# Patient Record
Sex: MALE | Race: BLACK OR AFRICAN AMERICAN | URBAN - METROPOLITAN AREA
[De-identification: names, ages, dates, MRNs, and addresses within clinical notes are randomized per-mention and may not be internally consistent; named-entity substitution may affect disease eponyms.]

---

## 2024-04-18 ENCOUNTER — APPOINTMENT (OUTPATIENT)
Dept: URBAN - METROPOLITAN AREA CLINIC 214 | Age: 64
Setting detail: DERMATOLOGY
End: 2024-04-21

## 2024-04-18 DIAGNOSIS — B35.1 TINEA UNGUIUM: ICD-10-CM

## 2024-04-18 PROCEDURE — OTHER KOH PREP: OTHER

## 2024-04-18 PROCEDURE — OTHER MIPS QUALITY: OTHER

## 2024-04-18 PROCEDURE — OTHER NAIL CLIPPING FOR PATHOLOGY: OTHER

## 2024-04-18 PROCEDURE — OTHER COUNSELING: OTHER

## 2024-04-18 PROCEDURE — 99202 OFFICE O/P NEW SF 15 MIN: CPT

## 2024-04-18 ASSESSMENT — LOCATION DETAILED DESCRIPTION DERM
LOCATION DETAILED: LEFT GREAT TOENAIL
LOCATION DETAILED: RIGHT GREAT TOENAIL

## 2024-04-18 ASSESSMENT — LOCATION SIMPLE DESCRIPTION DERM
LOCATION SIMPLE: LEFT GREAT TOE
LOCATION SIMPLE: RIGHT GREAT TOE

## 2024-04-18 ASSESSMENT — LOCATION ZONE DERM: LOCATION ZONE: TOENAIL

## 2024-04-18 NOTE — PROCEDURE: KOH PREP
Detail Level: Detailed
Cpt Desired: 
Showing: +/- results
Koh Intro Text (From The.....): A KOH prep was ordered and evaluated from the right and left feet.
Koh Procedure Text (Tissue Harvesting Technique): A 15-blade scalpel was used to scrape the skin. The skin scrapings were placed on a glass slide, covered with a coverslip and a KOH solution was applied.

## 2024-04-18 NOTE — PROCEDURE: NAIL CLIPPING FOR PATHOLOGY
Detail Level: Detailed
Render Path Notes In Note?: No
Lab: [id number]
Lab Facility: [id number]
Billing Type: Third-Party Bill

## 2024-04-24 ENCOUNTER — APPOINTMENT (OUTPATIENT)
Dept: URBAN - METROPOLITAN AREA CLINIC 214 | Age: 64
Setting detail: DERMATOLOGY
End: 2024-04-24

## 2024-04-24 ENCOUNTER — RX ONLY (RX ONLY)
Age: 64
End: 2024-04-24

## 2024-04-24 DIAGNOSIS — B35.1 TINEA UNGUIUM: ICD-10-CM

## 2024-04-24 PROCEDURE — OTHER ORDER TESTS: OTHER

## 2024-04-24 RX ORDER — TERBINAFINE HYDROCHLORIDE 250 MG/1
TABLET ORAL
Qty: 90 | Refills: 0 | Status: ERX | COMMUNITY
Start: 2024-04-24

## 2024-04-24 NOTE — PROCEDURE: ORDER TESTS
Billing Type: Third-Party Bill
Bill For Surgical Tray: no
Lab Facility: [id number]
Expected Date Of Service: 04/24/2024
Performing Laboratory: [id number]